# Patient Record
Sex: FEMALE | Race: WHITE | HISPANIC OR LATINO | Employment: FULL TIME | URBAN - METROPOLITAN AREA
[De-identification: names, ages, dates, MRNs, and addresses within clinical notes are randomized per-mention and may not be internally consistent; named-entity substitution may affect disease eponyms.]

---

## 2017-12-19 ENCOUNTER — GENERIC CONVERSION - ENCOUNTER (OUTPATIENT)
Dept: OTHER | Facility: OTHER | Age: 52
End: 2017-12-19

## 2017-12-19 DIAGNOSIS — N83.201 CYST OF RIGHT OVARY: ICD-10-CM

## 2017-12-19 DIAGNOSIS — R10.32 LEFT LOWER QUADRANT PAIN: ICD-10-CM

## 2018-01-19 ENCOUNTER — HOSPITAL ENCOUNTER (OUTPATIENT)
Dept: ULTRASOUND IMAGING | Facility: MEDICAL CENTER | Age: 53
Discharge: HOME | End: 2018-01-19
Payer: COMMERCIAL

## 2018-01-19 DIAGNOSIS — R10.32 LEFT LOWER QUADRANT PAIN: ICD-10-CM

## 2018-01-19 DIAGNOSIS — N83.201 CYST OF RIGHT OVARY: ICD-10-CM

## 2018-01-19 PROCEDURE — 76856 US EXAM PELVIC COMPLETE: CPT

## 2018-01-19 PROCEDURE — 76830 TRANSVAGINAL US NON-OB: CPT

## 2018-01-22 ENCOUNTER — GENERIC CONVERSION - ENCOUNTER (OUTPATIENT)
Dept: OTHER | Facility: OTHER | Age: 53
End: 2018-01-22

## 2018-01-24 VITALS
BODY MASS INDEX: 38.7 KG/M2 | SYSTOLIC BLOOD PRESSURE: 102 MMHG | DIASTOLIC BLOOD PRESSURE: 72 MMHG | HEIGHT: 63 IN | WEIGHT: 218.38 LBS

## 2018-01-24 NOTE — RESULT NOTES
Verified Results  * [facility] [id number] 12:30PM Deb Casanova Order Number: [id number]    - Patient Instructions: To schedule this appointment, please contact Central Scheduling at [phone]  Test Name Result Flag Reference   US PELVIS COMPLETE (TRANSABDOMINAL AND TRANSVAGINAL) (Report)     PELVIC ULTRASOUND, COMPLETE     INDICATION: N83 201: Unspecified ovarian cyst, right side   R10 32: Left lower quadrant pain  History taken directly from the electronic ordering system  Left lower quadrant pain intermittently for some time    Last menstrual period was approximately 2 years ago  COMPARISON: None  TECHNIQUE:  Transabdominal pelvic ultrasound was performed in sagittal and transverse planes with a curvilinear transducer  Additional transvaginal imaging was performed to better evaluate the endometrium and ovaries  Imaging included volumetric    sweeps as well as traditional still imaging technique  FINDINGS:     UTERUS:   The uterus is retroverted in position, measuring 9 4 x 4 1 x 5 5 cm  There is a moderate amount of fluid within the endometrium, new from the prior study  Numerous punctate subendometrial foci redemonstrated  The cervix shows no suspicious abnormality  ENDOMETRIUM:    Endometrium now contains a moderate amount of simple fluid  In aggregate including the fluid in the endometrial stripe endometrial cavity is 12 mm  The endometrial leaflets excluding the fluid measure 2 mm posteriorly and anteriorly generating an    aggregate endometrial thickness exclusive of the fluid of approximately 4 mm  OVARIES/ADNEXA:   Right ovary: 3 9 x 2 x 1 5 cm  No suspicious right ovarian abnormality  Associated to the right ovary as a curvilinear tubular structure with anechoic internal material possibly a hydrosalpinx  In maximal dimension measuring up to 1 cm   Previously noted right ovarian lesion not    detected Doppler flow within normal limits  Left ovary: 2 4 x 1 4 x 1 7 cm  No suspicious left ovarian abnormality  Similar to the right adnexa is a tubular anechoic structure, not as large possibly a smaller hydrosalpinx  Doppler flow within normal limits  No suspicious adnexal mass or loculated collections  There is small amount of simple free fluid in the pelvis, likely physiologic in this premenopausal female  IMPRESSION:        1  Retroverted uterus containing fluid distended endometrial cavity  The anterior and posterior endometrial leaflets are not thickened but there is a moderate amount of fluid within the endometrial canal possibly related to underlying cervical    stenosis  2  Bilateral right greater than left hydrosalpinges suspected  Further clinical evaluation advised  Pelvic MRI could be of additional help to further characterization of the adnexal tubular structures as well as the subendometrial regions and could be   obtained as clinically indicated  3  Extensive subendometrial echoes similar to the prior study possibly adenomyosis dystrophic calcification less likely given the absence of shadowing artifact  Workstation performed: [id number]     Signed by:    Fernando Friedman MD   1/22/18

## 2018-03-06 ENCOUNTER — OFFICE VISIT (OUTPATIENT)
Dept: OBGYN CLINIC | Facility: MEDICAL CENTER | Age: 53
End: 2018-03-06

## 2018-03-06 VITALS — WEIGHT: 214.5 LBS | SYSTOLIC BLOOD PRESSURE: 120 MMHG | BODY MASS INDEX: 38 KG/M2 | DIASTOLIC BLOOD PRESSURE: 78 MMHG

## 2018-03-06 DIAGNOSIS — R10.2 FEMALE PELVIC PAIN: Primary | ICD-10-CM

## 2018-03-06 RX ORDER — OMEPRAZOLE 20 MG/1
CAPSULE, DELAYED RELEASE ORAL
COMMUNITY
Start: 2018-03-05

## 2018-03-06 RX ORDER — NAPROXEN 500 MG/1
500 TABLET ORAL 2 TIMES DAILY
COMMUNITY
End: 2018-03-08

## 2018-03-08 ENCOUNTER — OFFICE VISIT (OUTPATIENT)
Dept: OBGYN CLINIC | Facility: MEDICAL CENTER | Age: 53
End: 2018-03-08
Payer: COMMERCIAL

## 2018-03-08 VITALS — SYSTOLIC BLOOD PRESSURE: 122 MMHG | DIASTOLIC BLOOD PRESSURE: 72 MMHG | BODY MASS INDEX: 38.21 KG/M2 | WEIGHT: 215.7 LBS

## 2018-03-08 DIAGNOSIS — N70.11 HYDROSALPINX: ICD-10-CM

## 2018-03-08 DIAGNOSIS — R10.2 PELVIC PAIN: ICD-10-CM

## 2018-03-08 DIAGNOSIS — N95.0 POSTMENOPAUSAL BLEEDING: Primary | ICD-10-CM

## 2018-03-08 PROBLEM — G89.29 CHRONIC LLQ PAIN: Status: ACTIVE | Noted: 2017-12-19

## 2018-03-08 PROBLEM — R10.32 CHRONIC LLQ PAIN: Status: ACTIVE | Noted: 2017-12-19

## 2018-03-08 PROCEDURE — 58100 BIOPSY OF UTERUS LINING: CPT | Performed by: OBSTETRICS & GYNECOLOGY

## 2018-03-08 PROCEDURE — 88305 TISSUE EXAM BY PATHOLOGIST: CPT | Performed by: PATHOLOGY

## 2018-03-08 PROCEDURE — 88305 TISSUE EXAM BY PATHOLOGIST: CPT | Performed by: OBSTETRICS & GYNECOLOGY

## 2018-03-08 PROCEDURE — 99213 OFFICE O/P EST LOW 20 MIN: CPT | Performed by: OBSTETRICS & GYNECOLOGY

## 2018-03-08 NOTE — PROGRESS NOTES
Assessment/Plan      Postmenopausal bleeding  -     Tissue Exam  -     Endometrial biopsy    Hydrosalpinx  We reviewed US report from 18 - consistent with bilateral hydrosalpinx/ we discussed likely cause of chronic pain / we also discussed surgical removal / given  PMB we performed endometrial biopsy as patient may be better managed by [facility]/ will follow in 2 weeks     Pelvic pain          Subjective   Lacie Schroeder is an 47 y o  woman who presents for follow up for US and has now noticed PMB for 2 days  States it is mostly spotting and she notices when she wipes  Also states continued pelvic pain on left more than right  Menstrual History:  OB History      Para Term  AB Living    4 4 4     4    SAB TAB Ectopic Multiple Live Births                        No LMP recorded (lmp unknown)  Patient is postmenopausal        The following portions of the patient's history were reviewed and updated as appropriate: allergies, current medications, past family history, past medical history, past social history, past surgical history and problem list     Review of Systems  Pertinent items are noted in HPI       Objective    /72   Wt 97 8 kg (215 lb 11 2 oz)   LMP  (LMP Unknown)   BMI 38 21 kg/m²     General:   alert and oriented, in no acute distress   Heart: regular rate and rhythm, S1, S2 normal, no murmur, click, rub or gallop   Lungs: clear to auscultation bilaterally   Abdomen: soft, non-tender, without masses or organomegaly   Vulva: normal   Vagina: normal mucosa   Cervix: multiparous appearance, no cervical motion tenderness and no lesions   Uterus: normal size, mobile, retroverted   Adnexa: tenderness to palpation, no rebound no guarding

## 2018-03-08 NOTE — PROGRESS NOTES
Endometrial biopsy  Date/Time: 3/8/2018 4:47 PM  Performed by: Bhanu Gill  Authorized by: Bhanu Gill     Consent:     Consent obtained:  Verbal and written    Consent given by:  Patient    Procedural risks discussed:  Bleeding, infection and repeat procedure    Patient questions answered: yes      Patient agrees, verbalizes understanding, and wants to proceed: yes      Instructions and paperwork completed: yes    Indication:     Indications: Post-menopausal bleeding      Chronicity of post-menopausal bleeding:  New    Progression of post-menopausal bleeding:  Unable to specify (spotting x 2 days )  Pre-procedure:     Pre-procedure timeout performed: yes    Procedure:     Procedure: endometrial biopsy with Pipelle      A bivalve speculum was placed in the vagina: yes      Cervix cleaned and prepped: yes      A paracervical block was performed: no      An intracervical block was performed: no      The cervix was dilated: no      Uterus sounded: yes      Uterus sound depth (cm):  9    Specimen collected: specimen collected and sent to pathology      Patient tolerated procedure well with no complications: yes    Findings:     Uterus size:  Non-gravid    Cervix: normal    Comments:      Patient to return in 2 weeks for follow up

## 2018-03-12 ENCOUNTER — TELEPHONE (OUTPATIENT)
Dept: OBGYN CLINIC | Facility: MEDICAL CENTER | Age: 53
End: 2018-03-12

## 2018-03-12 DIAGNOSIS — R10.2 PELVIC PAIN: Primary | ICD-10-CM

## 2018-03-13 ENCOUNTER — OFFICE VISIT (OUTPATIENT)
Dept: OBGYN CLINIC | Facility: MEDICAL CENTER | Age: 53
End: 2018-03-13
Payer: COMMERCIAL

## 2018-03-13 VITALS — DIASTOLIC BLOOD PRESSURE: 74 MMHG | BODY MASS INDEX: 38.16 KG/M2 | SYSTOLIC BLOOD PRESSURE: 116 MMHG | WEIGHT: 215.4 LBS

## 2018-03-13 DIAGNOSIS — R10.2 PELVIC PAIN: Primary | ICD-10-CM

## 2018-03-13 DIAGNOSIS — N95.0 PMB (POSTMENOPAUSAL BLEEDING): ICD-10-CM

## 2018-03-13 DIAGNOSIS — N70.11 HYDROSALPINX: ICD-10-CM

## 2018-03-13 PROCEDURE — 85025 COMPLETE CBC W/AUTO DIFF WBC: CPT | Performed by: OBSTETRICS & GYNECOLOGY

## 2018-03-13 PROCEDURE — 99214 OFFICE O/P EST MOD 30 MIN: CPT | Performed by: OBSTETRICS & GYNECOLOGY

## 2018-03-13 PROCEDURE — 36415 COLL VENOUS BLD VENIPUNCTURE: CPT | Performed by: OBSTETRICS & GYNECOLOGY

## 2018-03-13 RX ORDER — KETOROLAC TROMETHAMINE 10 MG/1
10 TABLET, FILM COATED ORAL EVERY 6 HOURS PRN
Qty: 10 TABLET | Refills: 0 | Status: SHIPPED | OUTPATIENT
Start: 2018-03-13

## 2018-03-13 NOTE — PROGRESS NOTES
Assessment/Plan      Chronic Pelvic pain   -     US pelvis complete w transvaginal; Future    PMB (postmenopausal bleeding)  -     US pelvis complete w transvaginal; Future  -     CBC and differential  Today we reviewed US consistent with benign proliferative endometrium with tubal metaplasia , no endometrial hyperplasia or atypia seen  Hydrosalpinx  -     US pelvis complete w transvaginal; Future   Today we again discussed US findings from January consistent with bilateral hydrosalpinx and thickened lining / we discussed that in the setting of chronic pelvic pain and now questionable PMB (as patient states had not seen any menses for over 2 years)  Would recommend surgical approach in the form of [address] / bilateral salpingectomy  We discussed conservative management could be performed with pain medication if desired and would consider hormonal medication for bleeding as  an alternative   Patient states would like to repeat US and see if there has been any improvement of hydrosalpinx prior to making decision     Subjective   Kiara Contreras is an 47 y o    woman who presents for follow up from [facility]   States since [facility] she has been having some slight spotting   She has continued to have pelvic pain now mostly on left radiating to her loewr back   No fevers or chills  No nausea or vomiting   Denies fevers     Menstrual History:  OB History      Para Term  AB Living    4 4 4     4    SAB TAB Ectopic Multiple Live Births                        No LMP recorded (lmp unknown)   Patient is postmenopausal        The following portions of the patient's history were reviewed and updated as appropriate: allergies, current medications, past family history, past medical history, past social history, past surgical history and problem list     Review of Systems  A comprehensive review of systems was negative except for: as indicated in HPI     Objective    /74   Wt 97 7 kg (215 lb 6 4 oz)   LMP  (LMP Unknown) Breastfeeding? Yes   BMI 38 16 kg/m²     General:   alert and oriented, in no acute distress   Heart: regular rate and rhythm, S1, S2 normal, no murmur, click, rub or gallop   Lungs: clear to auscultation bilaterally   Abdomen: soft, non-tender, without masses or organomegaly   Vulva: normal   Vagina: normal mucosa, no palpable nodules   Cervix: scant blood noted , light pink , no bright red bleeding noted    Uterus: mobile, non-tender   Adnexa: tenderness to palpation on exam bilterally, no rebound no guarding   Limited exam secondary to habitus

## 2018-03-13 NOTE — LETTER
March 13, 2018     Patient: Nico Haddad   YOB: 1965   Date of Visit: 3/13/2018       To Whom it May Concern:    Nico Haddad is under my professional care  She was seen in my office on 3/13/2018  Please excuse patient from work 3/12/18 and 3/13/18  Patient is able to return to work 3/14/18  If you have any questions or concerns, please don't hesitate to call           Sincerely,          Alejandra Riddle MD        CC: Nico Haddad

## 2018-03-14 LAB
BASOPHILS # BLD AUTO: 0.07 THOUSANDS/ΜL (ref 0–0.1)
BASOPHILS NFR BLD AUTO: 1 % (ref 0–1)
EOSINOPHIL # BLD AUTO: 0.07 THOUSAND/ΜL (ref 0–0.61)
EOSINOPHIL NFR BLD AUTO: 1 % (ref 0–6)
ERYTHROCYTE [DISTWIDTH] IN BLOOD BY AUTOMATED COUNT: 13.9 % (ref 11.6–15.1)
HCT VFR BLD AUTO: 45.4 % (ref 34.8–46.1)
HGB BLD-MCNC: 15 G/DL (ref 11.5–15.4)
LYMPHOCYTES # BLD AUTO: 1.48 THOUSANDS/ΜL (ref 0.6–4.47)
LYMPHOCYTES NFR BLD AUTO: 29 % (ref 14–44)
MCH RBC QN AUTO: 30.9 PG (ref 26.8–34.3)
MCHC RBC AUTO-ENTMCNC: 33 G/DL (ref 31.4–37.4)
MCV RBC AUTO: 94 FL (ref 82–98)
MONOCYTES # BLD AUTO: 0.21 THOUSAND/ΜL (ref 0.17–1.22)
MONOCYTES NFR BLD AUTO: 4 % (ref 4–12)
NEUTROPHILS # BLD AUTO: 3.29 THOUSANDS/ΜL (ref 1.85–7.62)
NEUTS SEG NFR BLD AUTO: 65 % (ref 43–75)
NRBC BLD AUTO-RTO: 0 /100 WBCS
PLATELET # BLD AUTO: 291 THOUSANDS/UL (ref 149–390)
PMV BLD AUTO: 11 FL (ref 8.9–12.7)
RBC # BLD AUTO: 4.85 MILLION/UL (ref 3.81–5.12)
WBC # BLD AUTO: 5.13 THOUSAND/UL (ref 4.31–10.16)

## 2018-03-16 RX ORDER — KETOROLAC TROMETHAMINE 10 MG/1
10 TABLET, FILM COATED ORAL EVERY 6 HOURS PRN
Qty: 20 TABLET | Refills: 0
Start: 2018-03-16